# Patient Record
Sex: FEMALE | Race: WHITE | NOT HISPANIC OR LATINO
[De-identification: names, ages, dates, MRNs, and addresses within clinical notes are randomized per-mention and may not be internally consistent; named-entity substitution may affect disease eponyms.]

---

## 2022-02-15 PROBLEM — Z00.00 ENCOUNTER FOR PREVENTIVE HEALTH EXAMINATION: Status: ACTIVE | Noted: 2022-02-15

## 2022-03-08 ENCOUNTER — NON-APPOINTMENT (OUTPATIENT)
Age: 52
End: 2022-03-08

## 2022-07-20 ENCOUNTER — APPOINTMENT (OUTPATIENT)
Dept: COLORECTAL SURGERY | Facility: CLINIC | Age: 52
End: 2022-07-20
Payer: COMMERCIAL

## 2022-07-20 VITALS
RESPIRATION RATE: 18 BRPM | OXYGEN SATURATION: 18 % | BODY MASS INDEX: 29.79 KG/M2 | WEIGHT: 161.9 LBS | DIASTOLIC BLOOD PRESSURE: 73 MMHG | HEIGHT: 62 IN | SYSTOLIC BLOOD PRESSURE: 136 MMHG | HEART RATE: 60 BPM

## 2022-07-20 DIAGNOSIS — Z78.9 OTHER SPECIFIED HEALTH STATUS: ICD-10-CM

## 2022-07-20 DIAGNOSIS — Z80.1 FAMILY HISTORY OF MALIGNANT NEOPLASM OF TRACHEA, BRONCHUS AND LUNG: ICD-10-CM

## 2022-07-20 DIAGNOSIS — Z82.49 FAMILY HISTORY OF ISCHEMIC HEART DISEASE AND OTHER DISEASES OF THE CIRCULATORY SYSTEM: ICD-10-CM

## 2022-07-20 DIAGNOSIS — E78.5 HYPERLIPIDEMIA, UNSPECIFIED: ICD-10-CM

## 2022-07-20 DIAGNOSIS — I10 ESSENTIAL (PRIMARY) HYPERTENSION: ICD-10-CM

## 2022-07-20 DIAGNOSIS — F17.200 NICOTINE DEPENDENCE, UNSPECIFIED, UNCOMPLICATED: ICD-10-CM

## 2022-07-20 DIAGNOSIS — K63.5 POLYP OF COLON: ICD-10-CM

## 2022-07-20 DIAGNOSIS — E53.8 DEFICIENCY OF OTHER SPECIFIED B GROUP VITAMINS: ICD-10-CM

## 2022-07-20 DIAGNOSIS — Z77.22 CONTACT WITH AND (SUSPECTED) EXPOSURE TO ENVIRONMENTAL TOBACCO SMOKE (ACUTE) (CHRONIC): ICD-10-CM

## 2022-07-20 DIAGNOSIS — E55.9 VITAMIN D DEFICIENCY, UNSPECIFIED: ICD-10-CM

## 2022-07-20 PROCEDURE — 99242 OFF/OP CONSLTJ NEW/EST SF 20: CPT

## 2022-07-20 RX ORDER — ATORVASTATIN CALCIUM 20 MG/1
20 TABLET, FILM COATED ORAL
Refills: 0 | Status: ACTIVE | COMMUNITY

## 2022-07-20 RX ORDER — ACETAMINOPHEN/DIPHENHYDRAMINE 500MG-25MG
1000 TABLET ORAL
Refills: 0 | Status: ACTIVE | COMMUNITY

## 2022-07-20 RX ORDER — AMLODIPINE BESYLATE 5 MG/1
5 TABLET ORAL
Refills: 0 | Status: ACTIVE | COMMUNITY

## 2022-07-20 NOTE — HISTORY OF PRESENT ILLNESS
[FreeTextEntry1] : 51 F referred to us by Dr Herrera - for a polyp at the appendiceal orifice (unsuitable for colonoscopic polypectomy). The biopsy reporting sessile serrated lesion. Two other polyps were resected at the colonoscopy (hepatic flexure and sigmoid colon). The patient has no symptoms, and this was her first colonoscopy.\par She is keen to discuss and schedule surgery.

## 2022-07-20 NOTE — ASSESSMENT
[FreeTextEntry1] : 51 F w/ cecal sessile serrated polyp located at the appendiceal orifice making it unsuitable for colonoscopic polypectomy. \par Plan:\par I discussed surgery with the patient: robotic, possible laparoscopic, possible open partial cecectomy and appendectomy, possible ileo-cecectomy.\par I also discussed possible complications and explained that in case the final pathology reports cancer, she may need a more extensive surgery.\par She understands and would like to schedule the procedure.\par All questions answered.

## 2022-07-20 NOTE — PHYSICAL EXAM
[No HSM] : no hepatosplenomegaly [Exam Deferred] : exam was deferred [No Rash or Lesion] : No rash or lesion [Alert] : alert [Oriented to Person] : oriented to person [Oriented to Place] : oriented to place [Oriented to Time] : oriented to time [Calm] : calm [Abdomen Masses] : No abdominal masses [Abdomen Tenderness] : ~T No ~M abdominal tenderness [de-identified] : Right upper paramedian incision scar (previous cholecystectomy); previous  scar [de-identified] : Normal [de-identified] : N [de-identified] : N [de-identified] : N [de-identified] : N

## 2022-08-09 ENCOUNTER — APPOINTMENT (OUTPATIENT)
Dept: COLORECTAL SURGERY | Facility: HOSPITAL | Age: 52
End: 2022-08-09

## 2022-08-09 ENCOUNTER — RESULT REVIEW (OUTPATIENT)
Age: 52
End: 2022-08-09

## 2022-08-09 PROCEDURE — 44970 LAPAROSCOPY APPENDECTOMY: CPT

## 2022-08-10 ENCOUNTER — NON-APPOINTMENT (OUTPATIENT)
Age: 52
End: 2022-08-10

## 2022-08-24 ENCOUNTER — APPOINTMENT (OUTPATIENT)
Dept: COLORECTAL SURGERY | Facility: CLINIC | Age: 52
End: 2022-08-24

## 2022-08-24 VITALS
HEART RATE: 58 BPM | OXYGEN SATURATION: 97 % | DIASTOLIC BLOOD PRESSURE: 78 MMHG | SYSTOLIC BLOOD PRESSURE: 150 MMHG | TEMPERATURE: 98.3 F

## 2022-08-24 DIAGNOSIS — K63.5 POLYP OF COLON: ICD-10-CM

## 2022-08-24 PROCEDURE — 99024 POSTOP FOLLOW-UP VISIT: CPT

## 2022-08-24 NOTE — HISTORY OF PRESENT ILLNESS
[FreeTextEntry1] : 51 F here for post-op visit. She is s/p lap partial cecectomy and appendectomy for a sessile serrated polyp at the appendiceal orifice (unsuitable for colonoscopic polypectomy). The polyp was completely removed with negative margins.\par She is doing well. Reports her bowel movements have still not returned to 'normal'. Mild pain at the left lower 5mm port inciison site.\par No other complaints.

## 2022-08-24 NOTE — PHYSICAL EXAM
[Abdomen Masses] : No abdominal masses [Abdomen Tenderness] : ~T No ~M abdominal tenderness [No HSM] : no hepatosplenomegaly [Exam Deferred] : exam was deferred [No Rash or Lesion] : No rash or lesion [Alert] : alert [Oriented to Person] : oriented to person [Oriented to Place] : oriented to place [Oriented to Time] : oriented to time [Calm] : calm [de-identified] : Soft, non tender; all incisions clean and healing well. [de-identified] : Normal [de-identified] : N [de-identified] : N [de-identified] : N

## 2022-08-24 NOTE — ASSESSMENT
[FreeTextEntry1] : 51 F here for post-op visit; s/p lap partial cecectomy and appendectomy. Doing well.\par Plan:\par Normal diet.\par Stool softeners as needed.\par Colonoscopy after 1 year -  Dr Herrera.\par See again as needed.\par All questions answered.

## 2022-11-29 ENCOUNTER — NON-APPOINTMENT (OUTPATIENT)
Age: 52
End: 2022-11-29

## 2022-12-01 ENCOUNTER — APPOINTMENT (OUTPATIENT)
Dept: THORACIC SURGERY | Facility: CLINIC | Age: 52
End: 2022-12-01
Payer: COMMERCIAL

## 2022-12-01 VITALS — HEIGHT: 63 IN | WEIGHT: 150 LBS | BODY MASS INDEX: 26.58 KG/M2

## 2022-12-01 DIAGNOSIS — F17.210 NICOTINE DEPENDENCE, CIGARETTES, UNCOMPLICATED: ICD-10-CM

## 2022-12-01 PROCEDURE — G0296 VISIT TO DETERM LDCT ELIG: CPT

## 2022-12-01 NOTE — HISTORY OF PRESENT ILLNESS
[Current] : Current [TextBox_13] : Referred by Dr. Kapaln \par \par BRITTA ALVAREZ had telephonic visit for a review of eligibility and discussion of the Low dose CT lung cancer screening program. The following was reviewed and confirmed the patient meets screening eligibility criteria.\par -Age 52 year\par Smoking Status:\par -Current smoker\par -Number of pack(s) per day: 3/4 - 1 PPD\par -Number of years smoked: 36\par -Number of pack years smokin\par \par Ms. ALVAREZ denies any signs or symptoms of lung cancer including new cough, changing cough, hemoptysis, and unintentional weight loss. \par \par Ms. ALVAREZ reports dx Covid infection 2021- mild symptoms.     She  denies any personal history of lung cancer. Reports  lung cancer in a 1st degree relative: Mother. Reports no lung cancer in a 2nd degree relative. Denies any history of lung disease. Denies any  history of  occupational exposures. Has exposure to 2nd hand smoke.\par  [PacksperYear] : 32

## 2022-12-01 NOTE — REASON FOR VISIT
[Other Location: e.g. School (Enter Location, City,State)___] : at [unfilled], at the time of the visit. [Medical Office: ([facility])___] : at the medical office located in  [Verbal consent obtained from patient] : the patient, [unfilled] [Initial Evaluation] : an initial evaluation visit [Review of Eligibility] : review of eligibility [Low-Dose CT Screening Discussion] : low-dose CT lung cancer screening discussion

## 2022-12-01 NOTE — PLAN
[Smoking Cessation Guidance Provided] : Smoking cessation guidance was provided to patient [[facility]] : referred to [facility] [phone] [Smoking Cessation] : smoking cessation [FreeTextEntry1] : Plan:\par \par -Low dose CT chest for lung cancer screening. RENAECAITLIN GARCIA ordered the low dose CT.     \par \par Auth [id number] 10/24/22- 12/8/2022  \par \par -Follow up with patient and her referring provider after her LDCT results have been reviewed by the multidisciplinary clinical team, if needed.\par \par -Encouraged smoking cessation.\par \par -Referred to CTC\par \par Should I screen? tool utilized. 6 Year risk of lung cancer is 1.8  %. \par \par Patient wishes to proceed with screening.\par \par Engaged in discussion regarding risks of screening during Covid-19 pandemic and precautions that are being used  to reduce exposure.\par \par Engaged in shared decision making with Ms. ALVAREZ . Answered all questions. She verbalized understanding and agreement. She knows to call back with and questions or concerns.\par

## 2022-12-01 NOTE — ASSESSMENT
[Discussed Risks and Advised to Quit Smoking] : Discussed risks and advised to quit smoking [Discussed Cessation Medication] : cessation medication was discussed [Ready] : Patient is ready for cessation intervention [Contemplation] : Contemplation: The patient is considering quitting smoking [Other smokers in household] : other smokers in household [de-identified] : Acknowledged how  difficult it is to quit smoking. Advised that quitting smoking is the most important thing a person can do for their health. Discussed strategies to deal with cravings. Discussed the importance of having a strategy planned in advance of quit date. Discussed use of daily nicotine patch and use of gum, lozenge prn for cravings.  [de-identified] :  smokes

## 2022-12-19 ENCOUNTER — NON-APPOINTMENT (OUTPATIENT)
Age: 52
End: 2022-12-19

## 2023-02-08 ENCOUNTER — APPOINTMENT (OUTPATIENT)
Dept: PULMONOLOGY | Facility: CLINIC | Age: 53
End: 2023-02-08
Payer: COMMERCIAL

## 2023-02-08 VITALS
SYSTOLIC BLOOD PRESSURE: 130 MMHG | TEMPERATURE: 97.3 F | OXYGEN SATURATION: 97 % | BODY MASS INDEX: 32.25 KG/M2 | HEART RATE: 82 BPM | DIASTOLIC BLOOD PRESSURE: 80 MMHG | WEIGHT: 182 LBS | HEIGHT: 63 IN

## 2023-02-08 DIAGNOSIS — R93.89 ABNORMAL FINDINGS ON DIAGNOSTIC IMAGING OF OTHER SPECIFIED BODY STRUCTURES: ICD-10-CM

## 2023-02-08 DIAGNOSIS — R05.8 OTHER SPECIFIED COUGH: ICD-10-CM

## 2023-02-08 PROCEDURE — 99203 OFFICE O/P NEW LOW 30 MIN: CPT

## 2023-02-08 RX ORDER — METRONIDAZOLE 500 MG/1
500 TABLET ORAL
Qty: 3 | Refills: 0 | Status: DISCONTINUED | COMMUNITY
Start: 2022-08-03 | End: 2023-02-08

## 2023-02-08 RX ORDER — NEOMYCIN SULFATE 500 MG/1
500 TABLET ORAL
Qty: 6 | Refills: 0 | Status: DISCONTINUED | COMMUNITY
Start: 2022-08-03 | End: 2023-02-08

## 2023-02-09 PROBLEM — R93.89 ABNORMAL CHEST CT: Status: ACTIVE | Noted: 2023-02-09

## 2023-02-09 NOTE — HISTORY OF PRESENT ILLNESS
[TextBox_4] : 51 yo F ex-smoker (quit 1.5 mo ago, 32py) presenting to establish care after lung cancer screening performed. Currently asymptomatic and has no respiratory complaints including dyspnea, wheezing, coughing. \par Patient had her first LDCT on 12/13/22. In comparison to 3/2021 film, this study showed improvement of centrilobular groundglass micronodules, but with residual on the upper lobes bilaterally. Solid 5mm stable in RUL and previously seen 7mm nodule in RUL resolved.\par In the intermin, she visited [facility] ED on 12/21/22 for an episode of cough and slight dyspnea. CTA was performed then which showed mild hilar lymphadenopathy and again no suspicious nodules.\par

## 2023-02-09 NOTE — ASSESSMENT
[FreeTextEntry1] : 53 yo F ex-smoker (quit 1.5 mo ago, 32py) presenting to establish care after lung cancer screening\par \par > LCS\par > Former smoker\par \par - LCS/LDCT scan showed improvement of upper and mid lung ground glass micronodules, which was likely respiratory bronchiolitis related to her smoking. Currently she has a stable 5mm nodule, which will be followed with serial CT scan. Plan for repeat in 6-12 months. \par - Advised to continue smoking cessation\par - PFT for baseline function to see if there is any significant obstruction\par - Discussion about possible BETTY initiated and to be finalized at a later time.\par \par f/u after PFT performed.

## 2024-03-20 ENCOUNTER — APPOINTMENT (OUTPATIENT)
Dept: THORACIC SURGERY | Facility: CLINIC | Age: 54
End: 2024-03-20
Payer: COMMERCIAL

## 2024-03-20 VITALS — BODY MASS INDEX: 34.96 KG/M2 | WEIGHT: 190 LBS | HEIGHT: 62 IN

## 2024-03-20 DIAGNOSIS — Z87.891 PERSONAL HISTORY OF NICOTINE DEPENDENCE: ICD-10-CM

## 2024-03-20 PROCEDURE — G0296 VISIT TO DETERM LDCT ELIG: CPT

## 2024-03-20 NOTE — DATA REVIEWED
[Lung Cancer Screening] : Patient underwent lung cancer screening [2] : 2 [S] : S [TextBox_12] : 12/22 [TextBox_52] : 1

## 2024-03-20 NOTE — HISTORY OF PRESENT ILLNESS
[Former] : Former [TextBox_13] : Referred by Dr. Kaplan  BRITTA ALVAREZ had telephonic visit for a review of eligibility and discussion of the Low dose CT lung cancer screening program. The following was reviewed and confirmed the patient meets screening eligibility criteria.  Smoking Status: -Former  smoker -Number of pack(s) per day: 3/4 - 1 PPD Number of years smoked: 36 -Number of pack years smokin Years since quit:  1 Quit year:  Ms. ALVAREZ denies any signs or symptoms of lung cancer including new cough, changing cough, hemoptysis, and unintentional weight loss.  Ms. ALVAREZ reports dx Covid infection 2021- mild symptoms and again 2024 treated with Paxlovid, HTN. She denies DX of lung disease, connective tissue disease, and any personal history of cancer.  Reports lung cancer in a 1st degree relative: Mother. Reports no lung cancer in a 2nd degree relative. Has exposure to 2nd hand smoke. Denies any history of occupational exposures  [YearQuit] : 2022 [PacksperYear] : 32

## 2024-03-20 NOTE — PLAN
[FreeTextEntry1] : Plan:  -Low dose CT chest for lung cancer screening. SYDNI GARCIA ordered the low dose CT.       -Follow up with her referring provider after her LDCT results have been reviewed by the multidisciplinary clinical team, if needed.   -Encourage continued smoking abstinence.   -Should I screen? tool utilized. 6 Year risk of lung cancer is   1.2%.   Patient wishes to proceed with screening.  Engaged in discussion regarding risks of screening during Covid-19 pandemic and precautions that are being used  to reduce exposure.  Engaged in shared decision making with MsSydney ANTONIO . Answered all questions. She verbalized understanding and agreement. She knows to call back with and questions or concerns.

## 2024-03-20 NOTE — REASON FOR VISIT
[Other Location: e.g. School (Enter Location, City,State)___] : at [unfilled], at the time of the visit. [Other Location: e.g. Home (Enter Location, City,State)___] : at [unfilled] [Verbal consent obtained from patient] : the patient, [unfilled] [Annual Follow-Up] : an annual follow-up visit [Review of Eligibility] : review of eligibility [Low-Dose CT Screening Discussion] : low-dose CT lung cancer screening discussion

## 2025-06-02 ENCOUNTER — APPOINTMENT (OUTPATIENT)
Dept: PULMONOLOGY | Facility: CLINIC | Age: 55
End: 2025-06-02
Payer: COMMERCIAL

## 2025-06-02 VITALS — WEIGHT: 190 LBS | HEIGHT: 62 IN | BODY MASS INDEX: 34.96 KG/M2

## 2025-06-02 PROCEDURE — G0296 VISIT TO DETERM LDCT ELIG: CPT | Mod: 93
